# Patient Record
Sex: FEMALE | Race: WHITE
[De-identification: names, ages, dates, MRNs, and addresses within clinical notes are randomized per-mention and may not be internally consistent; named-entity substitution may affect disease eponyms.]

---

## 2018-09-24 ENCOUNTER — HOSPITAL ENCOUNTER (EMERGENCY)
Dept: HOSPITAL 62 - ER | Age: 58
Discharge: HOME | End: 2018-09-24
Payer: MEDICARE

## 2018-09-24 VITALS — DIASTOLIC BLOOD PRESSURE: 68 MMHG | SYSTOLIC BLOOD PRESSURE: 136 MMHG

## 2018-09-24 DIAGNOSIS — I25.2: ICD-10-CM

## 2018-09-24 DIAGNOSIS — R07.9: Primary | ICD-10-CM

## 2018-09-24 DIAGNOSIS — M79.602: ICD-10-CM

## 2018-09-24 LAB
ADD MANUAL DIFF: NO
ALBUMIN SERPL-MCNC: 4.2 G/DL (ref 3.5–5)
ALP SERPL-CCNC: 75 U/L (ref 38–126)
ALT SERPL-CCNC: 67 U/L (ref 9–52)
ANION GAP SERPL CALC-SCNC: 8 MMOL/L (ref 5–19)
AST SERPL-CCNC: 36 U/L (ref 14–36)
BASOPHILS # BLD AUTO: 0 10^3/UL (ref 0–0.2)
BASOPHILS NFR BLD AUTO: 0.4 % (ref 0–2)
BILIRUB DIRECT SERPL-MCNC: 0.3 MG/DL (ref 0–0.4)
BILIRUB SERPL-MCNC: 0.5 MG/DL (ref 0.2–1.3)
BUN SERPL-MCNC: 16 MG/DL (ref 7–20)
CALCIUM: 9.9 MG/DL (ref 8.4–10.2)
CHLORIDE SERPL-SCNC: 108 MMOL/L (ref 98–107)
CO2 SERPL-SCNC: 24 MMOL/L (ref 22–30)
EOSINOPHIL # BLD AUTO: 0.3 10^3/UL (ref 0–0.6)
EOSINOPHIL NFR BLD AUTO: 2.7 % (ref 0–6)
ERYTHROCYTE [DISTWIDTH] IN BLOOD BY AUTOMATED COUNT: 12.9 % (ref 11.5–14)
GLUCOSE SERPL-MCNC: 134 MG/DL (ref 75–110)
HCT VFR BLD CALC: 38.3 % (ref 36–47)
HGB BLD-MCNC: 13 G/DL (ref 12–15.5)
LYMPHOCYTES # BLD AUTO: 3.1 10^3/UL (ref 0.5–4.7)
LYMPHOCYTES NFR BLD AUTO: 33 % (ref 13–45)
MCH RBC QN AUTO: 31.8 PG (ref 27–33.4)
MCHC RBC AUTO-ENTMCNC: 33.9 G/DL (ref 32–36)
MCV RBC AUTO: 94 FL (ref 80–97)
MONOCYTES # BLD AUTO: 0.8 10^3/UL (ref 0.1–1.4)
MONOCYTES NFR BLD AUTO: 8 % (ref 3–13)
NEUTROPHILS # BLD AUTO: 5.2 10^3/UL (ref 1.7–8.2)
NEUTS SEG NFR BLD AUTO: 55.9 % (ref 42–78)
PLATELET # BLD: 279 10^3/UL (ref 150–450)
POTASSIUM SERPL-SCNC: 4.1 MMOL/L (ref 3.6–5)
PROT SERPL-MCNC: 7.6 G/DL (ref 6.3–8.2)
RBC # BLD AUTO: 4.08 10^6/UL (ref 3.72–5.28)
SODIUM SERPL-SCNC: 140.4 MMOL/L (ref 137–145)
TOTAL CELLS COUNTED % (AUTO): 100 %
WBC # BLD AUTO: 9.4 10^3/UL (ref 4–10.5)

## 2018-09-24 PROCEDURE — 36415 COLL VENOUS BLD VENIPUNCTURE: CPT

## 2018-09-24 PROCEDURE — 80053 COMPREHEN METABOLIC PANEL: CPT

## 2018-09-24 PROCEDURE — 99284 EMERGENCY DEPT VISIT MOD MDM: CPT

## 2018-09-24 PROCEDURE — 85025 COMPLETE CBC W/AUTO DIFF WBC: CPT

## 2018-09-24 PROCEDURE — 84484 ASSAY OF TROPONIN QUANT: CPT

## 2018-09-24 PROCEDURE — 93010 ELECTROCARDIOGRAM REPORT: CPT

## 2018-09-24 PROCEDURE — 93005 ELECTROCARDIOGRAM TRACING: CPT

## 2018-09-24 NOTE — ER DOCUMENT REPORT
ED General





- General


Chief Complaint: Chest Pain > 30


Stated Complaint: PAIN LEFT ARM


Time Seen by Provider: 09/24/18 22:13


Notes: 


Patient is 58-year-old female presents with complaint of pain starting in the 

left mid arm and going down into her hand with a tingling station into her left 

hand.  It is worse when she abducts her left shoulder.  She says started around 

5 PM.  She had similar pain in April when she had a heart attack.  At that time 

she also had associated chest pressure which she does not have this time.  She 

denies any chest pain or chest pressure this time.  No shortness of breath.  

She is here from [place].  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  She 

denies any trauma or injuries to show that she is aware of.  She says in April 

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  

That is why she has pacemaker.  They did a heart cath and she did not have a 

coronary blockage and did not require any stenting.


TRAVEL OUTSIDE OF THE [place] IN LAST 30 DAYS: No





Past Medical History





- Social History


Smoking Status: Never Smoker


Frequency of alcohol use: None


Drug Abuse: None


Family History: Reviewed & Not Pertinent





Review of Systems





- Review of Systems


Notes: 





My Normal Review Basic





REVIEW OF SYSTEMS:


CONSTITUTIONAL :  Denies fever,  chills, or sweats.  Denies recent illness.


EENT:   No head or face pain.


CARDIOVASCULAR:  Denies chest pain.


RESPIRATORY:  Denies cough, cold, or chest congestion.  Denies shortness of 

breath, difficulty breathing, or wheezing.


GASTROINTESTINAL:  Denies abdominal pain.  Denies nausea, vomiting, or 

diarrhea.  


MUSCULOSKELETAL: Pain in left arm.


NEUROLOGICAL:  Denies altered mental status or loss of consciousness.    Denies 

weakness or paralysis or loss of use of either side.  Tingling type sensation 

into left hand.


ALL OTHER SYSTEMS REVIEWED AND NEGATIVE.





Physical Exam





- Vital signs


Vitals: 


 











Temp Pulse Resp BP Pulse Ox


 


 98.5 F   69   20   160/70 H  96 


 


 09/24/18 20:54  09/24/18 20:54  09/24/18 20:54  09/24/18 20:54  09/24/18 20:54














- Notes


Notes: 





General Appearance: Well nourished, alert, cooperative, no acute distress, no 

obvious discomfort.


Vitals: reviewed, See vital signs table.


Eyes: PERRL, EOMI, Conjuctiva clear


Mouth: No decreasd moisture


Neck: Supple, no neck tenderness, No thyromegaly


Lungs: No wheezing, No rales, No rhonci, No accessory muscle use, good air 

exchange bilaterally.


Heart: Normal rate, Regular rythm, No murmur, no rub


Abdomen: Normal BS, soft, No rigidity, No abdominal tenderness, No guarding, no 

rebound,


Extremities: strength 5/5 in all extremities, good pulses in all extremities, 

patient has repeat reduction of her symptoms including arm pain when I abduct 

her left shoulder.  No pain with flexion of the shoulder.  Patient has good 

distal sensation into her left hand.  She is able to oppose her thumb with her 

fifth digit.  She is able to abduct and abduct the fingers of her hands.  She 

is able to open and close her fist without difficulty.


Skin: warm, dry, appropriate color, no rash


Neuro: speech clear, oriented x 3, normal affect, responds appropriately to 

questions.





Course





- Re-evaluation


Re-evalutation: 





09/25/18 06:06


Patient's troponin is negative.  She had a previous MI she had chest pressure 

associated with the left arm pain.  This time she has no chest pressure or 

chest pain.  Her MI was in April.  At that time she had a follow-up heart cath 

which showed no evidence of coronary disease or obstruction.  At that time her 

MI was related to her dysrhythmia of tachybradycardia syndrome.  She now has a 

pacemaker and is not having arrhythmia on the monitor.  She looks well.  Pain 

is reproduced with abduction of her left shoulder.  It appears to be more 

musculoskeletal.  At this time I feel she is safe to be discharged home but 

informed her she needs to have a low threshold to return to the ER if she has 

worsening recurrent pain, any pain or pressure in her chest, difficulty 

breathing, or she feels unwell in any way.  Patient agrees with plan will be 

discharged home.





Dictation of this chart was performed using voice recognition software; 

therefore, there may be some unintended grammatical errors.





- Vital Signs


Vital signs: 


 











Temp Pulse Resp BP Pulse Ox


 


 98.1 F   62   20   136/68 H  96 


 


 09/24/18 23:48  09/24/18 23:48  09/24/18 23:48  09/24/18 23:48  09/24/18 23:48














- Laboratory


Result Diagrams: 


 09/24/18 22:45





 09/24/18 22:45


Laboratory results interpreted by me: 


 











  09/24/18





  22:45


 


Chloride  108 H


 


Glucose  134 H


 


ALT  67 H














- EKG Interpretation by Me


Additional EKG results interpreted by me: 





09/24/18 22:14


EKG is reviewed and interpreted by me.  EKG shows sinus rhythm with a rate of 

84 bpm.  No ST segment elevation or depression.  Patient does have occasional 

PVC.  UT interval, QRS duration, QTc intervals are within normal range.  No old 

EKG available for comparison at this time.





Discharge





- Discharge


Clinical Impression: 


 Left arm pain





Condition: Good


Disposition: HOME, SELF-CARE


Additional Instructions: 


Please follow up with your doctor as soon as you get back to [place]. Please 

have a low threshold to return to the ER if you have chest pressure, chest pain

, difficulty breathing, weakness, worsening pain in your arm, or feel that you 

are worsening in any way.

## 2018-09-24 NOTE — EKG REPORT
SEVERITY:- ABNORMAL ECG -

ATRIAL-PACED COMPLEXES

MULTIFORM VENTRICULAR PREMATURE COMPLEXES

:

Confirmed by: Isabel Hogue MD 24-Sep-2018 21:54:51